# Patient Record
Sex: FEMALE | Race: WHITE | NOT HISPANIC OR LATINO | Employment: FULL TIME | URBAN - METROPOLITAN AREA
[De-identification: names, ages, dates, MRNs, and addresses within clinical notes are randomized per-mention and may not be internally consistent; named-entity substitution may affect disease eponyms.]

---

## 2022-04-12 ENCOUNTER — TRANSCRIBE ORDERS (OUTPATIENT)
Dept: ADMINISTRATIVE | Facility: HOSPITAL | Age: 57
End: 2022-04-12

## 2022-04-12 DIAGNOSIS — Z12.31 VISIT FOR SCREENING MAMMOGRAM: Primary | ICD-10-CM

## 2022-04-19 ENCOUNTER — OFFICE VISIT (OUTPATIENT)
Dept: OBSTETRICS AND GYNECOLOGY | Facility: CLINIC | Age: 57
End: 2022-04-19

## 2022-04-19 VITALS
HEIGHT: 65 IN | DIASTOLIC BLOOD PRESSURE: 70 MMHG | SYSTOLIC BLOOD PRESSURE: 120 MMHG | WEIGHT: 250 LBS | BODY MASS INDEX: 41.65 KG/M2

## 2022-04-19 DIAGNOSIS — Z01.419 ROUTINE GYNECOLOGICAL EXAMINATION: Primary | ICD-10-CM

## 2022-04-19 DIAGNOSIS — E66.01 MORBID OBESITY WITH BMI OF 40.0-44.9, ADULT: ICD-10-CM

## 2022-04-19 DIAGNOSIS — N95.0 PMB (POSTMENOPAUSAL BLEEDING): ICD-10-CM

## 2022-04-19 DIAGNOSIS — R93.89 ENDOMETRIAL THICKENING ON ULTRASOUND: ICD-10-CM

## 2022-04-19 PROCEDURE — 58100 BIOPSY OF UTERUS LINING: CPT | Performed by: OBSTETRICS & GYNECOLOGY

## 2022-04-19 PROCEDURE — 99386 PREV VISIT NEW AGE 40-64: CPT | Performed by: OBSTETRICS & GYNECOLOGY

## 2022-04-19 RX ORDER — LISINOPRIL 5 MG/1
TABLET ORAL
COMMUNITY
Start: 2022-01-18

## 2022-04-19 RX ORDER — HYDROCHLOROTHIAZIDE 25 MG/1
TABLET ORAL
COMMUNITY
Start: 2022-01-18

## 2022-04-19 NOTE — PROGRESS NOTES
GYN Annual Exam     CC - Here for annual exam. Patient is a new patient (last seen here )    Subjective   HPI  Spring Harrison is a 56 y.o. female, , who presents for annual well woman exam.  She is postmenopausal.  Patient reports problems with: postmenopausal bleeding.      Partner Status: Marital Status: .  New Partners since last visit: no.      Additional OB/GYN History   Current contraception: contraceptive methods: Post menopausal status    Last Pap :  negative  Last Completed Pap Smear          PAP SMEAR (Every 3 Years) Next due on 2022  SCANNED - PAP SMEAR              History of abnormal Pap smear: no  Family history of uterine, colon, breast, or ovarian cancer: yes - PGM ovarian cancer  Performs monthly Self-Breast Exam: yes  Last mammogram:  negative scheduled 2022  Last Completed Mammogram     This patient has no relevant Health Maintenance data.        Last colonoscopy: none  Last Completed Colonoscopy     This patient has no relevant Health Maintenance data.        Last DEXA: none   Exercises Regularly: no  Feelings of Anxiety or Depression: no    Tobacco Usage?: No   OB History        2    Para   2    Term   2            AB        Living   2       SAB        IAB        Ectopic        Molar        Multiple        Live Births   2                Health Maintenance   Topic Date Due   • COLORECTAL CANCER SCREENING  Never done   • ANNUAL PHYSICAL  Never done   • COVID-19 Vaccine (1) Never done   • TDAP/TD VACCINES (1 - Tdap) Never done   • ZOSTER VACCINE (1 of 2) Never done   • MAMMOGRAM  2018   • HEPATITIS C SCREENING  Never done   • INFLUENZA VACCINE  2022   • Annual Gynecologic Pelvic and Breast Exam  2023   • PAP SMEAR  2025   • Pneumococcal Vaccine 0-64  Aged Out       The additional following portions of the patient's history were reviewed and updated as appropriate: problem list.    Review of Systems   All other  "systems reviewed and are negative.      I have reviewed and agree with the HPI, ROS, and historical information as entered above. Robyn Zarate MD    Objective   /70   Ht 165.1 cm (65\")   Wt 113 kg (250 lb)   BMI 41.60 kg/m²     Physical Exam    General:  well developed; well obese, no acute distress  Skin:  No suspicious lesions seen  Thyroid: normal to inspection and palpation  Breasts:  Examined in supine position  Symmetric without masses or skin dimpling  Nipples normal without inversion, lesions or discharge  There are no palpable axillary nodes  CVS: RRR, no M/R/G, distal pulses wnl  Resp: CTAB, No W/R/R  Abdomen: soft, non-tender; no masses  no umbilical or inguinal hernias are present  no hepato-splenomegaly  Pelvis: Clinical staff was present for exam  External genitalia:  normal appearance of the external genitalia including Bartholin's and Wall's glands.  Urethra: no masses or tenderness  Urethral meatus: normal size;  No lesions or signs of prolapse  Bladder: non tender to palpation, no masses, no prolapse  Vagina:  normal pink mucosa without prolapse or lesions.  Cervix:  normal appearance.  Uterus:  normal size, shape and consistency.  Adnexa:  normal bimanual exam of the adnexa.  Perineum/Anus: normal appearance, no external hemorrhoids  Ext: 2+ pulses, no edema    Assessment/Plan     Assessment     Problem List Items Addressed This Visit     PMB (postmenopausal bleeding)    Relevant Orders    US Non-ob Transvaginal (Completed)    Tissue Pathology Exam    Routine gynecological examination - Primary    Relevant Orders    Liquid-based Pap Smear, Screening    Ambulatory Referral For Screening Colonoscopy    Morbid obesity with BMI of 40.0-44.9, adult (HCC)    Endometrial thickening on ultrasound          Plan     1. Pap screening guidelines reviewed  2. Pap with HPV done  3. Recommended use of Vitamin D replacement and getting adequate calcium in her diet. (1500mg)  4. Mammogram ordered " today  5. Reviewed monthly self breast exams.  Instructed to call with lumps, pain, or breast discharge.    6. Reviewed BMI and weight loss as preventative health measures.   7. Reviewed exercise and healthy diet as a preventative health measures.   8. Encouraged colon cancer screening.  Referral for Colonoscopy made.    9. EMB completed for PMPB and endometrial thickening    Robyn Zarate MD  04/19/2022

## 2022-04-25 ENCOUNTER — TELEPHONE (OUTPATIENT)
Dept: OBSTETRICS AND GYNECOLOGY | Facility: CLINIC | Age: 57
End: 2022-04-25

## 2022-04-25 NOTE — TELEPHONE ENCOUNTER
S/w pt to let her know we have not received lab results back yet and once we do we will CB with results. She v/u.

## 2022-04-26 DIAGNOSIS — N95.0 PMB (POSTMENOPAUSAL BLEEDING): Primary | ICD-10-CM

## 2022-04-26 PROBLEM — R93.89 ENDOMETRIAL THICKENING ON ULTRASOUND: Status: ACTIVE | Noted: 2022-04-26

## 2022-06-06 ENCOUNTER — HOSPITAL ENCOUNTER (OUTPATIENT)
Dept: MAMMOGRAPHY | Facility: HOSPITAL | Age: 57
Discharge: HOME | End: 2022-06-06
Admitting: OBSTETRICS & GYNECOLOGY

## 2022-06-06 DIAGNOSIS — Z12.31 VISIT FOR SCREENING MAMMOGRAM: ICD-10-CM

## 2022-06-06 PROCEDURE — 77067 SCR MAMMO BI INCL CAD: CPT

## 2022-06-06 PROCEDURE — 77063 BREAST TOMOSYNTHESIS BI: CPT

## 2022-06-06 PROCEDURE — 77067 SCR MAMMO BI INCL CAD: CPT | Performed by: RADIOLOGY

## 2022-06-06 PROCEDURE — 77063 BREAST TOMOSYNTHESIS BI: CPT | Performed by: RADIOLOGY

## 2022-06-09 ENCOUNTER — TELEPHONE (OUTPATIENT)
Dept: OBSTETRICS AND GYNECOLOGY | Facility: CLINIC | Age: 57
End: 2022-06-09

## 2022-06-09 NOTE — TELEPHONE ENCOUNTER
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 The fax number is [phone]. Patient was instructed to have medical record of US sent to us. Please advise.

## 2022-06-09 NOTE — TELEPHONE ENCOUNTER
Dr. Zarate patient    S/w patient- patient requesting R breast US order faxed to [facility]. Fax number verified. Informed patient that we will get this faxed over. Patient v/u.

## 2022-06-15 DIAGNOSIS — Z01.818 PREOP EXAMINATION: Primary | ICD-10-CM

## 2022-06-21 ENCOUNTER — LAB (OUTPATIENT)
Dept: PREADMISSION TESTING | Facility: HOSPITAL | Age: 57
End: 2022-06-21

## 2022-06-21 ENCOUNTER — OFFICE VISIT (OUTPATIENT)
Dept: OBSTETRICS AND GYNECOLOGY | Facility: CLINIC | Age: 57
End: 2022-06-21

## 2022-06-21 VITALS
BODY MASS INDEX: 42.38 KG/M2 | SYSTOLIC BLOOD PRESSURE: 130 MMHG | WEIGHT: 254.4 LBS | DIASTOLIC BLOOD PRESSURE: 80 MMHG | HEIGHT: 65 IN

## 2022-06-21 DIAGNOSIS — N95.0 PMB (POSTMENOPAUSAL BLEEDING): ICD-10-CM

## 2022-06-21 DIAGNOSIS — Z01.818 PREOP EXAMINATION: ICD-10-CM

## 2022-06-21 DIAGNOSIS — N84.0 ENDOMETRIAL POLYP: ICD-10-CM

## 2022-06-21 DIAGNOSIS — E66.01 MORBID OBESITY WITH BMI OF 40.0-44.9, ADULT: ICD-10-CM

## 2022-06-21 DIAGNOSIS — R93.89 ENDOMETRIAL THICKENING ON ULTRASOUND: ICD-10-CM

## 2022-06-21 DIAGNOSIS — Z01.818 PREOP EXAMINATION: Primary | ICD-10-CM

## 2022-06-21 LAB
ERYTHROCYTE [DISTWIDTH] IN BLOOD BY AUTOMATED COUNT: 12.4 % (ref 12.3–15.4)
HCT VFR BLD AUTO: 40.3 % (ref 34–46.6)
HGB BLD-MCNC: 13.5 G/DL (ref 12–15.9)
MCH RBC QN AUTO: 30.9 PG (ref 26.6–33)
MCHC RBC AUTO-ENTMCNC: 33.5 G/DL (ref 31.5–35.7)
MCV RBC AUTO: 92.2 FL (ref 79–97)
PLATELET # BLD AUTO: 275 10*3/MM3 (ref 140–450)
RBC # BLD AUTO: 4.37 10*6/MM3 (ref 3.77–5.28)
SARS-COV-2 RNA PNL SPEC NAA+PROBE: NOT DETECTED
WBC # BLD AUTO: 8.96 10*3/MM3 (ref 3.4–10.8)

## 2022-06-21 PROCEDURE — C9803 HOPD COVID-19 SPEC COLLECT: HCPCS

## 2022-06-21 PROCEDURE — U0004 COV-19 TEST NON-CDC HGH THRU: HCPCS

## 2022-06-21 PROCEDURE — 99213 OFFICE O/P EST LOW 20 MIN: CPT | Performed by: OBSTETRICS & GYNECOLOGY

## 2022-06-21 NOTE — PROGRESS NOTES
Gynecologic Preoperative Exam Note        Subjective   Spring Harrison is a 56 y.o. year old  who is scheduled for D&C Hysteroscopy and Myosure Polypectomy at [facility] on 22 at 10:30am.  Pre Admission testing has been scheduled for 22 at [facility]. Her pre operative diagnosis is Thickened endometrial lining. She does not need to see her PCP for preop clearance for this surgery. No LMP recorded. Patient is postmenopausal.. Her birth control method is Postmenopausal.  Her BMI is Body mass index is 42.33 kg/m²..      Patient has had just very little spotting since EMB/last visit.  EMB benign but c/w polyp.    She has reviewed the informational video on 2022.    She has reviewed the informational pamphlet on 2022.    She understands the risks of bleeding, infection, possible damage to other organ systems, including but not limited to the gastrointestinal tract and genitourinary tract.  She also understands the specific risks listed in the preop information (video, pamphlets, etc.).    She has reviewed and signed the preop consent form.    She has been instructed to have a light dinner the night before surgery, then nothing to eat or drink after midnight.  The day of surgery do not chew gum or smoke.  Remove all jewelry, nail polish, contact lenses prior to coming to the hospital.  Do not bring valuables or large sums of money with you. Patient was instructed on what time to arrive and where to check in, maps were given.  She was instructed that she will meet an Anesthesiologist and that an IV will be started to provide fluids and sedation.  The total time of procedure was discussed.  She was instructed that she will need a .      She has confirmed that she is not allergic to Latex.     She is on the following medications. These were reviewed with the patient today and instructed on which medications are ok to take with a sip of water prior to the  surgery.      Current Outpatient Medications:   •  hydroCHLOROthiazide (HYDRODIURIL) 25 MG tablet, , Disp: , Rfl:   •  lisinopril (PRINIVIL,ZESTRIL) 5 MG tablet, , Disp: , Rfl:      Past Medical History:   Diagnosis Date   • Fibroid    • HPV (human papilloma virus) infection    • Hypertension  ?   • Migraine     Not since i started BP meds   • Varicella  ?     Past Surgical History:   Procedure Laterality Date   • WISDOM TOOTH EXTRACTION  ?      OB History    Para Term  AB Living   2 2 2 0 0 2   SAB IAB Ectopic Molar Multiple Live Births   0 0 0 0 0 2      # Outcome Date GA Lbr Matt/2nd Weight Sex Delivery Anes PTL Lv   2 Term      Vag-Spont   JOSÉ MIGUEL   1 Term      Vag-Spont   JOSÉ MIGUEL     Social History     Tobacco Use   Smoking Status Former Smoker   • Packs/day: 0.50   • Years: 10.00   • Pack years: 5.00   • Types: Cigarettes   Smokeless Tobacco Not on file     Social History     Substance and Sexual Activity   Alcohol Use Not Currently     Social History     Substance and Sexual Activity   Drug Use Never       No Known Allergies    Review of Systems   Constitutional: Negative.    HENT: Negative.    Eyes: Negative.    Respiratory: Negative.    Cardiovascular: Negative.    Gastrointestinal: Negative.    Endocrine: Negative.    Genitourinary: Negative.    Musculoskeletal: Negative.    Skin: Negative.    Allergic/Immunologic: Negative.    Neurological: Negative.    Hematological: Negative.    Psychiatric/Behavioral: Negative.    All other systems reviewed and are negative.          Objective    Vitals:    22 1319   BP: 130/80     Physical exam  General: well developed; well nourished  no acute distress  obese - Body mass index is 42.33 kg/m².   Heart: Not performed.   Lungs: breathing is unlabored   Abdomen: soft, non-tender; no masses  no umbilical or inguinal hernias are present  no hepato-splenomegaly   Pelvis: Not performed today         Diagnoses and all orders for this  visit:    1. Preop examination (Primary)  -     CBC (No Diff)    2. Endometrial thickening on ultrasound    3. PMB (postmenopausal bleeding)    4. Morbid obesity with BMI of 40.0-44.9, adult ([facility])        Instructions:  1. The day of surgery, do not chew gum or smoke. Remove all jewelry, nail polish and contact lens prior to coming to the hospital. Do not bring large sums of money or valuables.    2. Reviewed with the patient the risk of bleeding, infections, possible damage to other organ systems, including but not limited to the gastrointestinal tract and genitourinary tract.  Reviewed the risk of anesthesia as well as the risk the surgery will not produce the desired results.  Operative permit signed and scanned into the chart.  3. Reviewed the risk of perforation of the uterus.  4. MICHAEL report has been reviewed.  5. Pain Medication Consent Form has been signed.  A review regarding proper medication administration; impact on driving and working while medicated; the safety of use in pregnancy; the potential for overdose; and the proper disposal and stroage of controlled medications has been done with the patient.  6. Electronically Identified Patient Education Materials provided electronically  7. Return in about 2 weeks (around 7/5/2022) for FOR POSTOP VISIT.    Robyn Zarate MD  6/21/2022       (Pt's PCP is Provider, No Known)        Robyn Zarate MD  6/22/2022

## 2022-06-23 ENCOUNTER — OUTSIDE FACILITY SERVICE (OUTPATIENT)
Dept: OBSTETRICS AND GYNECOLOGY | Facility: CLINIC | Age: 57
End: 2022-06-23

## 2022-06-23 ENCOUNTER — LAB REQUISITION (OUTPATIENT)
Dept: LAB | Facility: HOSPITAL | Age: 57
End: 2022-06-23

## 2022-06-23 DIAGNOSIS — N95.0 POSTMENOPAUSAL BLEEDING: ICD-10-CM

## 2022-06-23 PROCEDURE — 58558 HYSTEROSCOPY BIOPSY: CPT | Performed by: OBSTETRICS & GYNECOLOGY

## 2022-06-23 PROCEDURE — 88305 TISSUE EXAM BY PATHOLOGIST: CPT | Performed by: OBSTETRICS & GYNECOLOGY

## 2022-06-23 RX ORDER — IBUPROFEN 600 MG/1
600 TABLET ORAL EVERY 6 HOURS PRN
Qty: 20 TABLET | Refills: 0 | Status: SHIPPED | OUTPATIENT
Start: 2022-06-23

## 2022-06-23 RX ORDER — DOCUSATE SODIUM 100 MG/1
100 CAPSULE, LIQUID FILLED ORAL 2 TIMES DAILY
Qty: 60 CAPSULE | Refills: 1 | Status: SHIPPED | OUTPATIENT
Start: 2022-06-23

## 2022-06-23 RX ORDER — HYDROCODONE BITARTRATE AND ACETAMINOPHEN 5; 325 MG/1; MG/1
2 TABLET ORAL EVERY 4 HOURS PRN
Qty: 5 TABLET | Refills: 0 | Status: SHIPPED | OUTPATIENT
Start: 2022-06-23

## 2022-06-24 LAB
CYTO UR: NORMAL
LAB AP CASE REPORT: NORMAL
LAB AP CLINICAL INFORMATION: NORMAL
PATH REPORT.FINAL DX SPEC: NORMAL
PATH REPORT.GROSS SPEC: NORMAL

## 2022-06-26 RX ORDER — MEDROXYPROGESTERONE ACETATE 2.5 MG/1
5 TABLET ORAL DAILY
Qty: 30 TABLET | Refills: 12 | Status: SHIPPED | OUTPATIENT
Start: 2022-06-26 | End: 2023-02-05 | Stop reason: SDUPTHER

## 2022-06-29 ENCOUNTER — TELEPHONE (OUTPATIENT)
Dept: OBSTETRICS AND GYNECOLOGY | Facility: CLINIC | Age: 57
End: 2022-06-29

## 2022-06-29 NOTE — TELEPHONE ENCOUNTER
Pt called and stated that Dr. Zarate was going to put her on a medication and she wanted to speak to a nurse about this.     Please advise

## 2022-07-07 ENCOUNTER — TELEPHONE (OUTPATIENT)
Dept: OBSTETRICS AND GYNECOLOGY | Facility: CLINIC | Age: 57
End: 2022-07-07

## 2022-07-07 NOTE — TELEPHONE ENCOUNTER
Pt is needing a order for breast biopsy faxed (ultrasound guided left breast biopsy)  Fax [phone]

## 2022-07-12 ENCOUNTER — OFFICE VISIT (OUTPATIENT)
Dept: OBSTETRICS AND GYNECOLOGY | Facility: CLINIC | Age: 57
End: 2022-07-12

## 2022-07-12 VITALS
WEIGHT: 254 LBS | SYSTOLIC BLOOD PRESSURE: 120 MMHG | HEIGHT: 65 IN | DIASTOLIC BLOOD PRESSURE: 78 MMHG | BODY MASS INDEX: 42.32 KG/M2

## 2022-07-12 DIAGNOSIS — E66.01 MORBID OBESITY WITH BMI OF 40.0-44.9, ADULT: ICD-10-CM

## 2022-07-12 DIAGNOSIS — N95.0 PMB (POSTMENOPAUSAL BLEEDING): ICD-10-CM

## 2022-07-12 DIAGNOSIS — R93.89 ENDOMETRIAL THICKENING ON ULTRASOUND: ICD-10-CM

## 2022-07-12 DIAGNOSIS — N85.00 ENDOMETRIAL HYPERPLASIA: Primary | ICD-10-CM

## 2022-07-12 PROCEDURE — 99024 POSTOP FOLLOW-UP VISIT: CPT | Performed by: OBSTETRICS & GYNECOLOGY

## 2022-10-04 NOTE — PROGRESS NOTES
"Subjective   Chief Complaint   Patient presents with   • Post-op     D/C Hysteroscopy with Myosure     Spring Harrison is a 56 y.o. year old  presenting to be seen for her post-operative visit.  She denies anymore vaginal bleeding.    Procedure: EMB  Surgery date: 22 negative    Currently she reports no problems with eating, bowel movements, voiding, or wound drainage and pain is well controlled.    The pathology results from her procedure are in her chart and showed benign polyp with simple endometrial hyperplasia without atypia.    OTHER THINGS SHE WANTS TO DISCUSS TODAY:  Nothing else    The following portions of the patient's history were reviewed and updated as appropriate:current medications and allergies       Objective   /78   Ht 165.1 cm (65\")   Wt 115 kg (254 lb)   BMI 42.27 kg/m²     General:  well developed; well nourished  no acute distress  obese - Body mass index is 42.27 kg/m².   Abdomen: soft, non-tender; no masses  no umbilical or inguinal hernias are present  no hepato-splenomegaly   Pelvis: Not performed.          Assessment   1. S/P hysteroscopy with Myosure     Plan   1. May return to full activity with no restrictions  2. The importance of keeping all planned follow-up and taking all medications as prescribed was emphasized.  We reviewed the natural history of endometrial hyperplasia and a risk factor of obesity and family history of endometrial thickening in her mother who is already older and they decided not to pursue treatment.    3. We reviewed options for treatment with progesterone versus hysterectomy.  She would like to treat with progesterone at least for now.  We did review possible side effects of progesterone.  I have recommended a repeat evaluation in 6 months with ultrasound as well endometrial biopsy.  She voices understanding.  4. Return in about 6 months (around 2023) for WITH SONO.      No orders of the defined types were placed in this encounter.     "     This note was electronically signed.    Robyn Zarate MD  July 12, 2022   Temples.../Clavicles.../Shoulders...

## 2023-01-17 ENCOUNTER — OFFICE VISIT (OUTPATIENT)
Dept: OBSTETRICS AND GYNECOLOGY | Facility: CLINIC | Age: 58
End: 2023-01-17
Payer: COMMERCIAL

## 2023-01-17 VITALS
WEIGHT: 253 LBS | DIASTOLIC BLOOD PRESSURE: 76 MMHG | HEIGHT: 65 IN | SYSTOLIC BLOOD PRESSURE: 118 MMHG | BODY MASS INDEX: 42.15 KG/M2

## 2023-01-17 DIAGNOSIS — Z87.42 HISTORY OF ENDOMETRIAL HYPERPLASIA: ICD-10-CM

## 2023-01-17 DIAGNOSIS — N85.00 ENDOMETRIAL HYPERPLASIA: Primary | ICD-10-CM

## 2023-01-17 DIAGNOSIS — N95.0 PMB (POSTMENOPAUSAL BLEEDING): ICD-10-CM

## 2023-01-17 DIAGNOSIS — N84.0 ENDOMETRIAL POLYP: ICD-10-CM

## 2023-01-17 PROCEDURE — 99213 OFFICE O/P EST LOW 20 MIN: CPT | Performed by: OBSTETRICS & GYNECOLOGY

## 2023-01-17 PROCEDURE — 58100 BIOPSY OF UTERUS LINING: CPT | Performed by: OBSTETRICS & GYNECOLOGY

## 2023-01-18 LAB — REF LAB TEST METHOD: NORMAL

## 2023-02-05 RX ORDER — MEDROXYPROGESTERONE ACETATE 2.5 MG/1
5 TABLET ORAL DAILY
Qty: 30 TABLET | Refills: 12 | Status: SHIPPED | OUTPATIENT
Start: 2023-02-05

## 2023-02-07 ENCOUNTER — TELEPHONE (OUTPATIENT)
Dept: OBSTETRICS AND GYNECOLOGY | Facility: CLINIC | Age: 58
End: 2023-02-07
Payer: COMMERCIAL

## 2023-02-07 NOTE — TELEPHONE ENCOUNTER
Caller: Spring Harrison    Relationship to patient: Self    Best call back number: [phone]    Patient is needing: PT IS ON medroxyPROGESTERone (Provera) 2.5 MG tablet PACKAGE SAYS IF YOU START BLEEDING OR SPOTTING TO REACH OUT TO YOUR DR. PT STATES SHE HAS BEEN SPOTTING WITH SMALL CLOTS FOR ABOUT A WEEK. PT WOULD LIKE TO KNOW IF THAT'S NORMAL AND IF SHE SHOULD CONTINUE TAKING THE MEDICATION.     PT CAN BE REACHED ANYTIME AND VM CAN BE LEFT IF SHE ISN'T

## 2023-02-14 ENCOUNTER — TELEPHONE (OUTPATIENT)
Dept: OBSTETRICS AND GYNECOLOGY | Facility: CLINIC | Age: 58
End: 2023-02-14
Payer: COMMERCIAL

## 2023-02-14 NOTE — TELEPHONE ENCOUNTER
Per ALEXANDRA needs to take provera 2.5mg 2 tablets daily. Spoke with pharmacy. Needs to change quantity to #60.     Patient ok to  corrected rx now.

## 2023-02-14 NOTE — TELEPHONE ENCOUNTER
Pt stated she is out of progesterone stated pharmacy only gave her 30 pills and was told to take it 2X daily   Pt asked for refill

## 2023-05-04 ENCOUNTER — TELEPHONE (OUTPATIENT)
Dept: OBSTETRICS AND GYNECOLOGY | Facility: CLINIC | Age: 58
End: 2023-05-04
Payer: COMMERCIAL

## 2023-05-04 RX ORDER — MEDROXYPROGESTERONE ACETATE 2.5 MG/1
5 TABLET ORAL DAILY
Qty: 30 TABLET | Refills: 12 | Status: SHIPPED | OUTPATIENT
Start: 2023-05-04 | End: 2023-05-05 | Stop reason: SDUPTHER

## 2023-05-04 NOTE — TELEPHONE ENCOUNTER
Provera medication sent in to [facility]. Attempted to call pt to notify her it was sent in, pt answered and hung up, unable to leave voicemail to notify.

## 2023-05-04 NOTE — TELEPHONE ENCOUNTER
Pt is on Provera 2.5mg tablet.    Pt needs refill order sent to new [facility] pharmacy.    Pt recently switched to [facility] delivery for insurance reasons.    I already updated patients pharmacy in her chart

## 2023-05-05 ENCOUNTER — TELEPHONE (OUTPATIENT)
Dept: OBSTETRICS AND GYNECOLOGY | Facility: CLINIC | Age: 58
End: 2023-05-05
Payer: COMMERCIAL

## 2023-05-05 RX ORDER — MEDROXYPROGESTERONE ACETATE 2.5 MG/1
5 TABLET ORAL DAILY
Qty: 30 TABLET | Refills: 0 | Status: SHIPPED | OUTPATIENT
Start: 2023-05-05 | End: 2023-05-05 | Stop reason: SDUPTHER

## 2023-05-05 RX ORDER — MEDROXYPROGESTERONE ACETATE 10 MG/1
10 TABLET ORAL DAILY
Qty: 30 TABLET | Refills: 2 | Status: SHIPPED | OUTPATIENT
Start: 2023-05-05

## 2023-05-05 RX ORDER — MEDROXYPROGESTERONE ACETATE 2.5 MG/1
5 TABLET ORAL DAILY
Qty: 30 TABLET | Refills: 12 | Status: SHIPPED | OUTPATIENT
Start: 2023-05-15 | End: 2023-05-05 | Stop reason: SDUPTHER

## 2023-05-05 NOTE — TELEPHONE ENCOUNTER
PATIENT STATES SHE STATED WRONG PHARMACY TO HAVE PROVERA SENT TO. NEEDS TO BE [facility] - [place], [place] - [address] - [phone]  - [phone]  [phone] AND REQUEST TO HAVE PRESCRIPTIONS MAILED.    WOULD ALSO LIKE A CALL BACK TO DISCUSS GETTING SOME SENT TO [facility] TO GET THROUGH UNTIL MAIL ORDER CAN ARRIVE.

## 2023-05-05 NOTE — TELEPHONE ENCOUNTER
Pt needs a refill of provera sent to [facility] in [place], she would like other refills sent to [facility] pharmacy ([facility]). Placed order for refill to be picked up at [facility] in [place], [place].

## 2023-08-07 DIAGNOSIS — N85.00 ENDOMETRIAL HYPERPLASIA: Primary | ICD-10-CM

## 2023-08-07 DIAGNOSIS — N95.0 PMB (POSTMENOPAUSAL BLEEDING): ICD-10-CM

## 2023-08-08 ENCOUNTER — OFFICE VISIT (OUTPATIENT)
Dept: OBSTETRICS AND GYNECOLOGY | Facility: CLINIC | Age: 58
End: 2023-08-08
Payer: COMMERCIAL

## 2023-08-08 VITALS
HEIGHT: 65 IN | WEIGHT: 249.8 LBS | DIASTOLIC BLOOD PRESSURE: 78 MMHG | SYSTOLIC BLOOD PRESSURE: 114 MMHG | BODY MASS INDEX: 41.62 KG/M2

## 2023-08-08 DIAGNOSIS — N95.0 PMB (POSTMENOPAUSAL BLEEDING): ICD-10-CM

## 2023-08-08 DIAGNOSIS — N85.00 ENDOMETRIAL HYPERPLASIA: ICD-10-CM

## 2023-08-08 DIAGNOSIS — Z01.419 WELL WOMAN EXAM: Primary | ICD-10-CM

## 2023-08-08 DIAGNOSIS — E66.01 MORBID OBESITY WITH BMI OF 40.0-44.9, ADULT: ICD-10-CM

## 2023-08-08 NOTE — PROGRESS NOTES
Chief Complaint   Patient presents with    Gynecologic Exam    Follow-up     PMB       Subjective   HPI  Spring Harrison is a 57 y.o. female, . Her last LMP was No LMP recorded. Patient is postmenopausal. Who presents for follow up on PMB.      At her last visit she was treated with Provera. Since then she reports her symptoms/issue has remained unchanged. The patient reports additional symptoms as none.      Did have some light spotting a week or two ago with straining.    Previously seen for PMPB and had endometrial hyperplasia without atypia and polyp on path .  Initially didn't take progesterone as recommended.  Started provera in January when ET 7.3mm.  Now 7.1mm today.  Images are not good 2/2 poor penetration.    Additional OB/GYN History     Last Pap : 2022 Negative HPV -  Last Completed Pap Smear            Ordered - PAP SMEAR (Every 3 Years) Ordered on 2022  SCANNED - PAP SMEAR                    Last mammogram: 2022  Last Completed Mammogram            Ordered - MAMMOGRAM (Every 2 Years) Ordered on 2022  Mammo Screening Digital Tomosynthesis Bilateral With CAD    2016  Mammo diagnostic digital tomosynthesis left    2016  Mammo screening bilateral w CAD                    Tobacco Usage?: No   OB History          2    Para   2    Term   2            AB        Living   2         SAB        IAB        Ectopic        Molar        Multiple        Live Births   2                  Current Outpatient Medications:     hydroCHLOROthiazide (HYDRODIURIL) 25 MG tablet, , Disp: , Rfl:     lisinopril (PRINIVIL,ZESTRIL) 5 MG tablet, , Disp: , Rfl:     medroxyPROGESTERone (Provera) 10 MG tablet, Take 1 tablet by mouth Daily., Disp: 30 tablet, Rfl: 2     Past Medical History:   Diagnosis Date    Fibroid     HPV (human papilloma virus) infection     Hypertension  ?    Migraine     Not since i started BP meds     "Urinary tract infection     Varicella  ?        Past Surgical History:   Procedure Laterality Date    BREAST BIOPSY      DILATATION AND CURETTAGE  2022    HYSTEROSCOPY      WISDOM TOOTH EXTRACTION  ?        The additional following portions of the patient's history were reviewed and updated as appropriate: allergies, current medications, past family history, past medical history, past social history, past surgical history, and problem list.    Review of Systems   All other systems reviewed and are negative.    I have reviewed and agree with the HPI, ROS, and historical information as entered above. Robyn Zarate MD      Objective   /78   Ht 165.1 cm (65\")   Wt 113 kg (249 lb 12.8 oz)   BMI 41.57 kg/mý     Physical Exam  Physical Exam:  General:  well developed; well nourished  no acute distress  obese - Body mass index is 41.57 kg/mý.   Abdomen: soft, non-tender; no masses  no umbilical or inguinal hernias are present  no hepato-splenomegaly   Pelvis: Clinical staff was present for exam  External genitalia:  normal appearance of the external genitalia including Bartholin's and [place]'s glands.  :  urethral meatus normal;  Vaginal:  atrophic mucosal changes are present;  Cervix:  normal appearance. stenotic;  Uterus:  normal size, shape and consistency.  Adnexa:  normal bimanual exam of the adnexa.           Gynecologic Procedure Note        Endometrial Biopsy      Indications:NAME@ is a 57 y.o. , who presents today for endometrial biopsy.  The patient was noted to have Postmenopausal Bleeding.  Her LMP is No LMP recorded. Patient is postmenopausal. . After being presented with the risk, benefits, and specific detail of the procedure, the patient wished to proceed.  Written consent was obtained from patient.   Urine pregnancy test was Not indicated. Patient does not have an allergy to betadine or shellfish.     Procedure Details   The patient was placed on the table " in the dorsal lithotomy position.  She was draped in the appropriate manner.  A speculum was placed in the vagina.  The cervix was visualized and prepped with Betadine.  A tenaculum was placed on the anterior lip of the cervix for traction.  A small plastic 5 mm Pipelle syringe curette was inserted into the cervical canal.  The uterus was sounded to 6 cm's.  A vigorous four quadrant biopsy was performed, removing a small amount of tissue.  The tissue was placed in Formalin and sent to Pathology.  The patient tolerated the procedure well and she reported mild cramping.  The tenaculum was removed from the cervix and the speculum was removed.  The patient was observed for 10 minutes.           Complications: none.     Procedures    Review of Systems   All other systems reviewed and are negative.    Plan:  Orders Placed This Encounter   Procedures    Mammo Screening Digital Tomosynthesis Bilateral With CAD     Standing Status:   Future     Standing Expiration Date:   8/7/2024     Order Specific Question:   Reason for Exam:     Answer:   screening       Problem List Items Addressed This Visit       PMB (postmenopausal bleeding)    Relevant Orders    TISSUE EXAM, [facility] ([facility])    Morbid obesity with BMI of 40.0-44.9, adult    Endometrial hyperplasia    Well woman exam - Primary    Relevant Orders    LIQUID-BASED PAP SMEAR WITH HPV GENOTYPING IF ASCUS ([facility])    TISSUE EXAM, [facility] ([facility])    Mammo Screening Digital Tomosynthesis Bilateral With CAD           Instructions  Call the office in 5 business days for biopsy results.  Patient instructed to call the office if develops a fever of 100.4 or greater, vaginal bleeding heavier than a period, foul vaginal discharge or pain.     Robyn Zarate MD  08/08/2023   Assessment & Plan     Assessment     Problem List Items Addressed This Visit       PMB (postmenopausal bleeding)    Relevant Orders    TISSUE EXAM, [facility] ([facility])    Morbid obesity  with BMI of 40.0-44.9, adult    Endometrial hyperplasia    Well woman exam - Primary    Relevant Orders    LIQUID-BASED PAP SMEAR WITH HPV GENOTYPING IF ASCUS ([facility])    TISSUE EXAM, [facility] ([facility])    Mammo Screening Digital Tomosynthesis Bilateral With CAD         Plan     Return if symptoms worsen or fail to improve, for will call her with results to discuss plan.  All questions answered.      Robyn Zarate MD  08/08/2023

## 2023-08-09 LAB — REF LAB TEST METHOD: NORMAL

## 2023-08-10 ENCOUNTER — TELEPHONE (OUTPATIENT)
Dept: OBSTETRICS AND GYNECOLOGY | Facility: CLINIC | Age: 58
End: 2023-08-10
Payer: COMMERCIAL

## 2023-08-10 LAB — REF LAB TEST METHOD: NORMAL

## 2023-08-10 NOTE — TELEPHONE ENCOUNTER
I called patient and discussed scant but benign pathology with her.  Considering spotting despite progesterone for 6mo, I recommended hysterectomy for her hyperplasia.  She is starting a new job and just can't now unless absolutely necessary.  She will cont progesterone and f/u in 3-6mo for repeat sono.    Robyn Zarate MD  08/10/23  16:54 EDT

## 2023-11-06 RX ORDER — MEDROXYPROGESTERONE ACETATE 2.5 MG/1
5 TABLET ORAL DAILY
Qty: 30 TABLET | Refills: 1 | Status: SHIPPED | OUTPATIENT
Start: 2023-11-06

## 2023-12-07 DIAGNOSIS — R93.89 ENDOMETRIAL THICKENING ON ULTRASOUND: ICD-10-CM

## 2023-12-07 DIAGNOSIS — N95.0 PMB (POSTMENOPAUSAL BLEEDING): Primary | ICD-10-CM

## 2023-12-11 RX ORDER — MEDROXYPROGESTERONE ACETATE 2.5 MG/1
5 TABLET ORAL DAILY
Qty: 30 TABLET | Refills: 1 | Status: SHIPPED | OUTPATIENT
Start: 2023-12-11 | End: 2023-12-12 | Stop reason: SDUPTHER

## 2023-12-11 NOTE — TELEPHONE ENCOUNTER
Her next OV with Dr. Zarate is 2/13/2024. Refilled provera. The rx received for Provera 2.5mg take 2 disp #30 and that is only a 15 day supply. I called the pharmacy and the rx will be updated to Provera 5mg daily #30 instead of Provera 2.5mg BID #60

## 2023-12-12 ENCOUNTER — TELEPHONE (OUTPATIENT)
Dept: OBSTETRICS AND GYNECOLOGY | Facility: CLINIC | Age: 58
End: 2023-12-12
Payer: COMMERCIAL

## 2023-12-12 DIAGNOSIS — N95.0 PMB (POSTMENOPAUSAL BLEEDING): ICD-10-CM

## 2023-12-12 DIAGNOSIS — R93.89 ENDOMETRIAL THICKENING ON ULTRASOUND: ICD-10-CM

## 2023-12-12 RX ORDER — MEDROXYPROGESTERONE ACETATE 5 MG/1
5 TABLET ORAL DAILY
Qty: 30 TABLET | Refills: 1 | Status: SHIPPED | OUTPATIENT
Start: 2023-12-12

## 2023-12-12 NOTE — TELEPHONE ENCOUNTER
There where two sets of directions. New rx sent. The original was Provera 2.5mg take 2 tabs and it was changed to 5mg daily.

## 2024-02-12 ENCOUNTER — TELEPHONE (OUTPATIENT)
Dept: OBSTETRICS AND GYNECOLOGY | Facility: CLINIC | Age: 59
End: 2024-02-12
Payer: COMMERCIAL

## 2024-02-13 ENCOUNTER — TELEPHONE (OUTPATIENT)
Dept: OBSTETRICS AND GYNECOLOGY | Facility: CLINIC | Age: 59
End: 2024-02-13

## 2024-02-13 DIAGNOSIS — Z87.898 H/O ABNORMAL MAMMOGRAM: Primary | ICD-10-CM

## 2024-02-15 ENCOUNTER — TELEPHONE (OUTPATIENT)
Dept: OBSTETRICS AND GYNECOLOGY | Facility: CLINIC | Age: 59
End: 2024-02-15
Payer: COMMERCIAL

## 2024-03-11 DIAGNOSIS — R93.89 ENDOMETRIAL THICKENING ON ULTRASOUND: Primary | ICD-10-CM

## 2024-03-12 ENCOUNTER — OFFICE VISIT (OUTPATIENT)
Dept: OBSTETRICS AND GYNECOLOGY | Facility: CLINIC | Age: 59
End: 2024-03-12
Payer: COMMERCIAL

## 2024-03-12 VITALS
HEIGHT: 65 IN | BODY MASS INDEX: 39.32 KG/M2 | SYSTOLIC BLOOD PRESSURE: 110 MMHG | WEIGHT: 236 LBS | DIASTOLIC BLOOD PRESSURE: 74 MMHG

## 2024-03-12 DIAGNOSIS — N95.0 PMB (POSTMENOPAUSAL BLEEDING): ICD-10-CM

## 2024-03-12 DIAGNOSIS — R93.89 ENDOMETRIAL THICKENING ON ULTRASOUND: Primary | ICD-10-CM

## 2024-03-12 NOTE — PROGRESS NOTES
Chief Complaint   Patient presents with    Follow-up     Progesterone        Subjective   HPI  Spring Harrison is a 58 y.o. female, . Her last LMP was No LMP recorded. Patient is postmenopausal.. who presents for follow up on post menopausal spotting.      At her last visit she was treated with Provera. Since then she reports her symptoms/issue has improved. The patient reports additional symptoms as none.  She has had no more spotting or bleeding.      Additional OB/GYN History     Last Pap :   Last Completed Pap Smear            PAP SMEAR (Every 3 Years) Next due on 2023  LIQUID-BASED PAP SMEAR WITH HPV GENOTYPING IF ASCUS ([facility])    2022  SCANNED - PAP SMEAR                    Last mammogram:   Last Completed Mammogram            Scheduled - MAMMOGRAM (Every 2 Years) Scheduled for 3/28/2024      2022  Mammo Screening Digital Tomosynthesis Bilateral With CAD    2016  Mammo diagnostic digital tomosynthesis left    2016  Mammo screening bilateral w CAD                    Tobacco Usage?: No   OB History          2    Para   2    Term   2            AB        Living   2         SAB        IAB        Ectopic        Molar        Multiple        Live Births   2                  Current Outpatient Medications:     hydroCHLOROthiazide (HYDRODIURIL) 25 MG tablet, , Disp: , Rfl:     lisinopril (PRINIVIL,ZESTRIL) 5 MG tablet, , Disp: , Rfl:     medroxyPROGESTERone (PROVERA) 5 MG tablet, Take 1 tablet by mouth Daily., Disp: 30 tablet, Rfl: 1     Past Medical History:   Diagnosis Date    Fibroid     HPV (human papilloma virus) infection     Hypertension  ?    Migraine     Not since i started BP meds    Urinary tract infection     Varicella  ?        Past Surgical History:   Procedure Laterality Date    BREAST BIOPSY      DILATATION AND CURETTAGE  2022    HYSTEROSCOPY      WISDOM TOOTH EXTRACTION  ?   "      The additional following portions of the patient's history were reviewed and updated as appropriate: allergies, current medications, past family history, past medical history, past social history, past surgical history, and problem list.    Review of Systems   All other systems reviewed and are negative.      I have reviewed and agree with the HPI, ROS, and historical information as entered above. Robyn Zarate MD      Objective   /74   Ht 165.1 cm (65\")   Wt 107 kg (236 lb)   BMI 39.27 kg/m²     Physical Exam    Physical Exam:  General:  well developed; well nourished  no acute distress  obese - Body mass index is 39.27 kg/m².   Abdomen: soft, non-tender; no masses  no umbilical or inguinal hernias are present   Pelvis: Not performed.      Assessment & Plan     Assessment     Problem List Items Addressed This Visit       PMB (postmenopausal bleeding)    Relevant Medications    medroxyPROGESTERone (PROVERA) 5 MG tablet    Endometrial thickening on ultrasound - Primary    Relevant Medications    medroxyPROGESTERone (PROVERA) 5 MG tablet         Plan     Reviewed lining thinner.  As long as no bleeding will cont to monitor.  Reassurance given  Return in about 6 months (around 9/12/2024) for Annual physical.        Robyn Zarate MD  03/12/2024  "

## 2024-05-24 DIAGNOSIS — R93.89 ENDOMETRIAL THICKENING ON ULTRASOUND: ICD-10-CM

## 2024-05-24 DIAGNOSIS — N95.0 PMB (POSTMENOPAUSAL BLEEDING): ICD-10-CM

## 2024-05-24 RX ORDER — MEDROXYPROGESTERONE ACETATE 5 MG/1
5 TABLET ORAL DAILY
Qty: 30 TABLET | Refills: 3 | Status: SHIPPED | OUTPATIENT
Start: 2024-05-24

## 2024-09-13 ENCOUNTER — TELEPHONE (OUTPATIENT)
Dept: OBSTETRICS AND GYNECOLOGY | Facility: CLINIC | Age: 59
End: 2024-09-13

## 2024-09-13 NOTE — TELEPHONE ENCOUNTER
Hub staff attempted to follow warm transfer process and was unsuccessful     Caller: Spring Harrison    Relationship to patient: Self    Best call back number: [phone] / LVM    Patient is needing: PT HAD ANNUAL APPT SCHEDULED ON 09/17/24 - PT CANCELED APPT - PT IS WANTING TO R/S THE APPT UNTIL MARCH 2025 AND ADD AN U/S W/ POSSIBLE BIOPSY    HUB WAS UNABLE TO WT TO OFFICE    PLEASE CALL THE PT TO DISCUSS    THANK YOU!

## 2024-10-15 DIAGNOSIS — N95.0 PMB (POSTMENOPAUSAL BLEEDING): ICD-10-CM

## 2024-10-15 DIAGNOSIS — R93.89 ENDOMETRIAL THICKENING ON ULTRASOUND: ICD-10-CM

## 2024-10-15 NOTE — TELEPHONE ENCOUNTER
Attempted to return patient's call; immediately went to voice mail. Left voice message to call us back.

## 2024-10-15 NOTE — TELEPHONE ENCOUNTER
Caller: Spring Harrison    Relationship: Self    Best call back number: [phone]    What is the best time to reach you: ASAP    Do you know the name of the person who called: LEDY     What was the call regarding: MISSED CALL    Is it okay if the provider responds through MyChart: CALL BACK

## 2024-10-15 NOTE — TELEPHONE ENCOUNTER
Patient of Dr. Zarate; LOV 03/12/24 for PMB with endometrial thickening. Recommendation to return in 6 months for annual visit. Patient canceled appointment here for 09/17/24.   Attempted to return patient's call. Left voice message to call us back.

## 2024-10-16 ENCOUNTER — TELEPHONE (OUTPATIENT)
Dept: OBSTETRICS AND GYNECOLOGY | Facility: CLINIC | Age: 59
End: 2024-10-16

## 2024-10-16 RX ORDER — MEDROXYPROGESTERONE ACETATE 5 MG
5 TABLET ORAL DAILY
Qty: 30 TABLET | Refills: 5 | Status: SHIPPED | OUTPATIENT
Start: 2024-10-16

## 2024-10-16 NOTE — TELEPHONE ENCOUNTER
Informed patient that RX has been sent with refills to last until next visit here in March 2025. She v/u.

## 2024-10-16 NOTE — TELEPHONE ENCOUNTER
Caller: Spring Harrison    Relationship to patient: Self    Best call back number: [phone] (home)      Patient is needing: PT RETURNING CALL FROM [facility] FROM 10/15/24    PLEASE CALL BACK ANYTIME.

## 2024-10-16 NOTE — TELEPHONE ENCOUNTER
Addended by: FAWN JOHNS on: 6/22/2020 10:46 AM     Modules accepted: Orders     See phone call encounter from yesterday.

## 2024-10-16 NOTE — TELEPHONE ENCOUNTER
Spoke with patient.   LOV 03/12/24  States she called before she canceled the 6 month follow up in September and was told it was okay to wait until March to be seen.   Last Well Woman visit was 08/08/2023.  She states she has scheduled an annual visit here for 03/18/2025 and would like her Provera refilled until then.   States she is doing well with no problems.   Informed her I will check with Dr. Zarate and follow up with patient. She v/u and agreed.

## 2025-03-17 DIAGNOSIS — N85.00 ENDOMETRIAL HYPERPLASIA: ICD-10-CM

## 2025-03-17 DIAGNOSIS — N95.0 PMB (POSTMENOPAUSAL BLEEDING): Primary | ICD-10-CM

## 2025-03-18 ENCOUNTER — OFFICE VISIT (OUTPATIENT)
Dept: OBSTETRICS AND GYNECOLOGY | Facility: CLINIC | Age: 60
End: 2025-03-18
Payer: COMMERCIAL

## 2025-03-18 VITALS
WEIGHT: 246.8 LBS | SYSTOLIC BLOOD PRESSURE: 122 MMHG | HEIGHT: 65 IN | DIASTOLIC BLOOD PRESSURE: 68 MMHG | BODY MASS INDEX: 41.12 KG/M2

## 2025-03-18 DIAGNOSIS — Z12.31 ENCOUNTER FOR SCREENING MAMMOGRAM FOR MALIGNANT NEOPLASM OF BREAST: ICD-10-CM

## 2025-03-18 DIAGNOSIS — E66.01 MORBID OBESITY WITH BMI OF 40.0-44.9, ADULT: ICD-10-CM

## 2025-03-18 DIAGNOSIS — Z01.419 ROUTINE GYNECOLOGICAL EXAMINATION: ICD-10-CM

## 2025-03-18 DIAGNOSIS — N85.00 ENDOMETRIAL HYPERPLASIA: Primary | ICD-10-CM

## 2025-03-18 DIAGNOSIS — Z12.11 COLON CANCER SCREENING: ICD-10-CM

## 2025-03-18 RX ORDER — CHOLECALCIFEROL (VITAMIN D3) 50 MCG
2000 TABLET ORAL DAILY
COMMUNITY

## 2025-03-18 NOTE — PROGRESS NOTES
Gynecologic Annual Exam Note        GYN Annual Exam     CC - Here for annual exam.        HPI  Spring Harrison is a 59 y.o. female, , who presents for annual well woman exam as a established patient.  She is postmenopausal.. Denies vaginal bleeding.   There were no changes to her medical or surgical history since her last visit. Marital Status: .  She is not currently sexually active. STD testing recommendations have been explained to the patient and she declines STD testing.    The patient would like to discuss the following complaints today:  none    Additional OB/GYN History   On HRT? Yes. Details: provera    Last Pap : 23. Results: ASCUS. HPV: negative.   Last Completed Pap Smear            Awaiting Completion       PAP SMEAR (Every 3 Years) Order placed this encounter      2025  Order placed for LIQUID-BASED PAP SMEAR WITH HPV GENOTYPING REGARDLESS OF INTERPRETATION ([facility]) by Robyn Zarate MD    2023  LIQUID-BASED PAP SMEAR WITH HPV GENOTYPING IF ASCUS ([facility])    2022  SCANNED - PAP SMEAR                          History of abnormal Pap smear: ASCUS  Family history of uterine, colon, breast, or ovarian cancer: yes - paternal grandmother had ovarian cancer  Performs monthly Self-Breast Exam: yes  Last mammogram: 03/10/25. Done at [place], [place]. There is not a copy in the chart.    Last Completed Mammogram    This patient has no relevant Health Maintenance data.       Last colonoscopy: has had a colonoscopy about 10+ yrs ago wnl    Last Completed Colonoscopy    This patient has no relevant Health Maintenance data.         She has never had a bone density scan  Exercises Regularly: no  Feelings of Anxiety or Depression: no      Tobacco Usage?: No       Current Outpatient Medications:   •  Cholecalciferol (Vitamin D) 50 MCG (2000) tablet, Take 1 tablet by mouth Daily., Disp: , Rfl:   •  Cyanocobalamin (VITAMIN B 12 PO), Take  by mouth., Disp: ,  "Rfl:   •  hydroCHLOROthiazide (HYDRODIURIL) 25 MG tablet, , Disp: , Rfl:   •  lisinopril (PRINIVIL,ZESTRIL) 5 MG tablet, , Disp: , Rfl:     Patient is requesting refills of provera.    OB History          2    Para   2    Term   2            AB        Living   2         SAB        IAB        Ectopic        Molar        Multiple        Live Births   2                Past Medical History:   Diagnosis Date   • Fibroid    • HPV (human papilloma virus) infection    • Hypertension  ?   • Migraine     Not since i started BP meds   • Urinary tract infection    • Varicella  ?        Past Surgical History:   Procedure Laterality Date   • BREAST BIOPSY     • DILATATION AND CURETTAGE  2022   • HYSTEROSCOPY     • WISDOM TOOTH EXTRACTION  ?        Health Maintenance   Topic Date Due   • BMI FOLLOWUP  Never done   • TDAP/TD VACCINES (1 - Tdap) Never done   • COLORECTAL CANCER SCREENING  Never done   • Pneumococcal Vaccine 50+ (1 of 1 - PCV) Never done   • ZOSTER VACCINE (1 of 2) Never done   • HEPATITIS C SCREENING  Never done   • ANNUAL PHYSICAL  Never done   • Annual Gynecologic Pelvic and Breast Exam  2023   • MAMMOGRAM  2024   • INFLUENZA VACCINE  2024   • COVID-19 Vaccine ( season) 2024   • PAP SMEAR  2026       The additional following portions of the patient's history were reviewed and updated as appropriate: allergies, current medications, past family history, past medical history, past social history, past surgical history, and problem list.    Review of Systems    I have reviewed and agree with the HPI, ROS, and historical information as entered above. Robyn Zarate MD      Objective   /68   Ht 165.1 cm (65\")   Wt 112 kg (246 lb 12.8 oz)   BMI 41.07 kg/m²     Physical Exam  General:  well developed; well nourished  no acute distress; BMI 41  Skin:  No suspicious lesions seen  Thyroid: normal to inspection and " palpation  Breasts:  Examined in supine position  Symmetric without masses or skin dimpling  Nipples normal without inversion, lesions or discharge  There are no palpable axillary nodes  CVS: RRR, no M/R/G, distal pulses wnl  Resp: CTAB, No W/R/R  Abdomen: soft, non-tender; no masses  no umbilical or inguinal hernias are present  no hepato-splenomegaly  Pelvis: Clinical staff was present for exam  External genitalia:  normal appearance of the external genitalia including [place]th[place]'s and [place]'s glands.  Urethra: no masses or tenderness  Urethral meatus: normal size;  No lesions or signs of prolapse  Bladder: non tender to palpation, no masses, no prolapse  Vagina:  normal pink mucosa without prolapse or lesions.  Cervix:  normal appearance.  Uterus:  normal size, shape and consistency.  Adnexa:  normal bimanual exam of the adnexa.  Perineum/Anus: normal appearance, no external hemorrhoids  Ext: 2+ pulses, no edema      Assessment and Plan    Problem List Items Addressed This Visit       Routine gynecological examination    Relevant Orders    LIQUID-BASED PAP SMEAR WITH HPV GENOTYPING REGARDLESS OF INTERPRETATION ([facility])    Ambulatory Referral For Screening Colonoscopy (Completed)    Morbid obesity with BMI of 40.0-44.9, adult    Relevant Orders    LIQUID-BASED PAP SMEAR WITH HPV GENOTYPING REGARDLESS OF INTERPRETATION ([facility])    Endometrial hyperplasia - Primary    Overview   Simple hyperplasia on D&C, has been on provera for 2 years since.  Endometrium ill-defined but appears about the same.  Will change to prometrium x 1 more year and if ET remains same and pap normal next year, will d/c progestins.         Relevant Orders    LIQUID-BASED PAP SMEAR WITH HPV GENOTYPING REGARDLESS OF INTERPRETATION ([facility])    Colon cancer screening       GYN annual well woman exam.   Reviewed monthly self breast exams.  Instructed to call with lumps, pain, or breast discharge.  Yearly mammograms ordered.  Ordered  mammogram today.  Reviewed exercise as a preventative health measures.   Colonoscopy recommended.  See note below h/o endometrial hyperplasia  Return in about 1 year (around 3/18/2026) for Annual physical, WITH SONO.         Robyn Zarate MD  03/18/2025

## 2025-03-19 LAB — REF LAB TEST METHOD: NORMAL

## 2025-05-08 RX ORDER — PROGESTERONE 100 MG/1
100 CAPSULE ORAL DAILY
Qty: 30 CAPSULE | Refills: 12 | Status: SHIPPED | OUTPATIENT
Start: 2025-05-08

## 2025-05-19 ENCOUNTER — TELEPHONE (OUTPATIENT)
Dept: OBSTETRICS AND GYNECOLOGY | Facility: CLINIC | Age: 60
End: 2025-05-19
Payer: COMMERCIAL

## 2025-05-19 NOTE — TELEPHONE ENCOUNTER
"Patient of Dr. Zarate; LOV 03/18/25. NOV 03/30/26.  Returned patient's call.    Per Dr. Zarate's office note on 03/18/25: \"Simple hyperplasia on D&C, has been on provera for 2 years since. Endometrium ill-defined but appears about the same. Will change to prometrium x 1 more year and if ET remains same and pap normal next year, will d/c progestins.\"  Patient states the Prometrium was RX for vaginal use. It is causing some vaginal irritation and she is prefers oral medication.   Advised her that review of the RX order shows it was ordered for vaginal use but order was sent to pharmacy as oral use.   Advised her that she can take it by mouth nightly. Call if vaginal irritation fails to resolve. Patient v/u and agreed.   "

## 2025-05-19 NOTE — TELEPHONE ENCOUNTER
Patient was prescribed Progesterone (Prometrium) 100 MG capsule [561200] (Order [id number]) and it is not working for her and has been without her previous script for 2 weeks and wanted to discuss being put back on the previous script